# Patient Record
Sex: FEMALE | Race: BLACK OR AFRICAN AMERICAN | Employment: UNEMPLOYED | URBAN - METROPOLITAN AREA
[De-identification: names, ages, dates, MRNs, and addresses within clinical notes are randomized per-mention and may not be internally consistent; named-entity substitution may affect disease eponyms.]

---

## 2021-08-06 ENCOUNTER — HOSPITAL ENCOUNTER (OUTPATIENT)
Age: 36
Discharge: HOME | End: 2021-08-06
Payer: COMMERCIAL

## 2021-08-06 VITALS
DIASTOLIC BLOOD PRESSURE: 83 MMHG | HEART RATE: 80 BPM | RESPIRATION RATE: 18 BRPM | TEMPERATURE: 98 F | OXYGEN SATURATION: 100 % | SYSTOLIC BLOOD PRESSURE: 127 MMHG

## 2021-08-06 DIAGNOSIS — Z48.02 ENCOUNTER FOR REMOVAL OF SUTURES: Primary | ICD-10-CM

## 2021-08-06 PROCEDURE — 99202 OFFICE O/P NEW SF 15 MIN: CPT | Performed by: NURSE PRACTITIONER

## 2021-08-06 NOTE — ED INITIAL ASSESSMENT (HPI)
Pt states last Friday had stiches placed right calf. Pt here to have them removed. Pt denies symptoms.

## 2021-08-06 NOTE — ED PROVIDER NOTES
Patient Seen in: [facility]      History   No chief complaint on file.     Stated Complaint: Stitch removal    HPI/Subjective:   Well-appearing 59-year-old female presents for suture removal.  Patient communicates that 1 week ago today she wa tachypnea. Abdomen: Non-distended. Extremities: No focal swelling or tenderness. Capillary refill noted. Right lower extremity: 5 intact sutures noted to right lateral aspect of proximal leg. No wound drainage. No erythema. No swelling.   No stre